# Patient Record
Sex: FEMALE | Race: OTHER
[De-identification: names, ages, dates, MRNs, and addresses within clinical notes are randomized per-mention and may not be internally consistent; named-entity substitution may affect disease eponyms.]

---

## 2017-02-24 ENCOUNTER — HOSPITAL ENCOUNTER (OUTPATIENT)
Dept: HOSPITAL 23 - CWCC | Age: 52
Discharge: HOME | End: 2017-02-24
Payer: COMMERCIAL

## 2017-02-24 DIAGNOSIS — D25.9: ICD-10-CM

## 2017-02-24 DIAGNOSIS — Z12.31: Primary | ICD-10-CM

## 2017-02-24 DIAGNOSIS — N83.202: ICD-10-CM

## 2017-02-24 DIAGNOSIS — N83.292: ICD-10-CM

## 2017-02-24 PROCEDURE — G0202 SCR MAMMO BI INCL CAD: HCPCS

## 2017-03-21 ENCOUNTER — TELEPHONE (OUTPATIENT)
Dept: OBSTETRICS AND GYNECOLOGY | Facility: CLINIC | Age: 52
End: 2017-03-21

## 2017-03-21 NOTE — TELEPHONE ENCOUNTER
----- Message from Hodan Mazariegos MD sent at 3/21/2017  8:21 AM EDT -----  I am not seeing new patients.    ----- Message -----     From: Cynthia Reyer     Sent: 3/20/2017   1:26 PM       To: Hodan Mazariegos MD    Pt is asking to switch to a female physician from Dr. Colvin. Dr. Dukes prefers not to take her as a transfer so I told her I would ask you and call her or her  Tracy back # [phone]. Thank you.